# Patient Record
Sex: MALE | Race: WHITE | NOT HISPANIC OR LATINO | Employment: FULL TIME | URBAN - METROPOLITAN AREA
[De-identification: names, ages, dates, MRNs, and addresses within clinical notes are randomized per-mention and may not be internally consistent; named-entity substitution may affect disease eponyms.]

---

## 2019-01-17 ENCOUNTER — HISTORICAL (OUTPATIENT)
Dept: ADMINISTRATIVE | Facility: HOSPITAL | Age: 69
End: 2019-01-17

## 2019-01-17 LAB
ABS NEUT (OLG): 2.96 X10(3)/MCL (ref 2.1–9.2)
ALBUMIN SERPL-MCNC: 3.4 GM/DL (ref 3.4–5)
ALBUMIN/GLOB SERPL: 0.8 {RATIO}
ALP SERPL-CCNC: 96 UNIT/L (ref 50–136)
ALT SERPL-CCNC: 34 UNIT/L (ref 12–78)
APTT PPP: 27 SECOND(S) (ref 24.8–36.9)
AST SERPL-CCNC: 25 UNIT/L (ref 15–37)
BASOPHILS # BLD AUTO: 0 X10(3)/MCL (ref 0–0.2)
BASOPHILS NFR BLD AUTO: 1 %
BILIRUB SERPL-MCNC: 0.4 MG/DL (ref 0.2–1)
BILIRUBIN DIRECT+TOT PNL SERPL-MCNC: 0.1 MG/DL (ref 0–0.2)
BILIRUBIN DIRECT+TOT PNL SERPL-MCNC: 0.3 MG/DL (ref 0–0.8)
BUN SERPL-MCNC: 24 MG/DL (ref 7–18)
CALCIUM SERPL-MCNC: 9.2 MG/DL (ref 8.5–10.1)
CHLORIDE SERPL-SCNC: 108 MMOL/L (ref 98–107)
CO2 SERPL-SCNC: 28 MMOL/L (ref 21–32)
CREAT SERPL-MCNC: 1.32 MG/DL (ref 0.7–1.3)
EOSINOPHIL # BLD AUTO: 0.4 X10(3)/MCL (ref 0–0.9)
EOSINOPHIL NFR BLD AUTO: 5 %
ERYTHROCYTE [DISTWIDTH] IN BLOOD BY AUTOMATED COUNT: 16.1 % (ref 11.5–17)
GLOBULIN SER-MCNC: 4.1 GM/DL (ref 2.4–3.5)
GLUCOSE SERPL-MCNC: 99 MG/DL (ref 74–106)
HCT VFR BLD AUTO: 37.2 % (ref 42–52)
HGB BLD-MCNC: 12.4 GM/DL (ref 14–18)
INR PPP: 1.1 (ref 0–1.3)
LYMPHOCYTES # BLD AUTO: 2.8 X10(3)/MCL (ref 0.6–4.6)
LYMPHOCYTES NFR BLD AUTO: 40 %
MCH RBC QN AUTO: 29.6 PG (ref 27–31)
MCHC RBC AUTO-ENTMCNC: 33.3 GM/DL (ref 33–36)
MCV RBC AUTO: 88.8 FL (ref 80–94)
MONOCYTES # BLD AUTO: 0.8 X10(3)/MCL (ref 0.1–1.3)
MONOCYTES NFR BLD AUTO: 11 %
NEUTROPHILS # BLD AUTO: 2.96 X10(3)/MCL (ref 2.1–9.2)
NEUTROPHILS NFR BLD AUTO: 42 %
PLATELET # BLD AUTO: 179 X10(3)/MCL (ref 130–400)
PMV BLD AUTO: 9.3 FL (ref 9.4–12.4)
POTASSIUM SERPL-SCNC: 3.6 MMOL/L (ref 3.5–5.1)
PROT SERPL-MCNC: 7.5 GM/DL (ref 6.4–8.2)
PROTHROMBIN TIME: 14 SECOND(S) (ref 12.2–14.7)
RBC # BLD AUTO: 4.19 X10(6)/MCL (ref 4.7–6.1)
SODIUM SERPL-SCNC: 144 MMOL/L (ref 136–145)
WBC # SPEC AUTO: 7 X10(3)/MCL (ref 4.5–11.5)

## 2019-01-24 ENCOUNTER — HISTORICAL (OUTPATIENT)
Dept: LAB | Facility: HOSPITAL | Age: 69
End: 2019-01-24

## 2019-01-27 LAB — FINAL CULTURE: NORMAL

## 2020-05-21 ENCOUNTER — HISTORICAL (OUTPATIENT)
Dept: ADMINISTRATIVE | Facility: HOSPITAL | Age: 70
End: 2020-05-21

## 2020-05-24 LAB
FINAL CULTURE: NORMAL
FINAL CULTURE: NORMAL

## 2023-04-19 DIAGNOSIS — D64.9 ANEMIA, UNSPECIFIED: Primary | ICD-10-CM

## 2023-06-19 ENCOUNTER — OFFICE VISIT (OUTPATIENT)
Dept: HEMATOLOGY/ONCOLOGY | Facility: CLINIC | Age: 73
End: 2023-06-19
Payer: MEDICARE

## 2023-06-19 VITALS
TEMPERATURE: 99 F | DIASTOLIC BLOOD PRESSURE: 79 MMHG | RESPIRATION RATE: 18 BRPM | SYSTOLIC BLOOD PRESSURE: 170 MMHG | OXYGEN SATURATION: 96 % | HEART RATE: 62 BPM | HEIGHT: 67 IN | WEIGHT: 223 LBS | BODY MASS INDEX: 35 KG/M2

## 2023-06-19 DIAGNOSIS — D64.9 NORMOCYTIC ANEMIA: ICD-10-CM

## 2023-06-19 DIAGNOSIS — D64.9 ANEMIA, UNSPECIFIED: ICD-10-CM

## 2023-06-19 PROCEDURE — 3078F DIAST BP <80 MM HG: CPT | Mod: CPTII,,, | Performed by: STUDENT IN AN ORGANIZED HEALTH CARE EDUCATION/TRAINING PROGRAM

## 2023-06-19 PROCEDURE — 3077F SYST BP >= 140 MM HG: CPT | Mod: CPTII,,, | Performed by: STUDENT IN AN ORGANIZED HEALTH CARE EDUCATION/TRAINING PROGRAM

## 2023-06-19 PROCEDURE — 1126F PR PAIN SEVERITY QUANTIFIED, NO PAIN PRESENT: ICD-10-PCS | Mod: CPTII,,, | Performed by: STUDENT IN AN ORGANIZED HEALTH CARE EDUCATION/TRAINING PROGRAM

## 2023-06-19 PROCEDURE — 99204 PR OFFICE/OUTPT VISIT, NEW, LEVL IV, 45-59 MIN: ICD-10-PCS | Mod: ,,, | Performed by: STUDENT IN AN ORGANIZED HEALTH CARE EDUCATION/TRAINING PROGRAM

## 2023-06-19 PROCEDURE — 1126F AMNT PAIN NOTED NONE PRSNT: CPT | Mod: CPTII,,, | Performed by: STUDENT IN AN ORGANIZED HEALTH CARE EDUCATION/TRAINING PROGRAM

## 2023-06-19 PROCEDURE — 3008F BODY MASS INDEX DOCD: CPT | Mod: CPTII,,, | Performed by: STUDENT IN AN ORGANIZED HEALTH CARE EDUCATION/TRAINING PROGRAM

## 2023-06-19 PROCEDURE — 3288F PR FALLS RISK ASSESSMENT DOCUMENTED: ICD-10-PCS | Mod: CPTII,,, | Performed by: STUDENT IN AN ORGANIZED HEALTH CARE EDUCATION/TRAINING PROGRAM

## 2023-06-19 PROCEDURE — 1101F PT FALLS ASSESS-DOCD LE1/YR: CPT | Mod: CPTII,,, | Performed by: STUDENT IN AN ORGANIZED HEALTH CARE EDUCATION/TRAINING PROGRAM

## 2023-06-19 PROCEDURE — 1101F PR PT FALLS ASSESS DOC 0-1 FALLS W/OUT INJ PAST YR: ICD-10-PCS | Mod: CPTII,,, | Performed by: STUDENT IN AN ORGANIZED HEALTH CARE EDUCATION/TRAINING PROGRAM

## 2023-06-19 PROCEDURE — 4010F PR ACE/ARB THEARPY RXD/TAKEN: ICD-10-PCS | Mod: CPTII,,, | Performed by: STUDENT IN AN ORGANIZED HEALTH CARE EDUCATION/TRAINING PROGRAM

## 2023-06-19 PROCEDURE — 3077F PR MOST RECENT SYSTOLIC BLOOD PRESSURE >= 140 MM HG: ICD-10-PCS | Mod: CPTII,,, | Performed by: STUDENT IN AN ORGANIZED HEALTH CARE EDUCATION/TRAINING PROGRAM

## 2023-06-19 PROCEDURE — 99204 OFFICE O/P NEW MOD 45 MIN: CPT | Mod: ,,, | Performed by: STUDENT IN AN ORGANIZED HEALTH CARE EDUCATION/TRAINING PROGRAM

## 2023-06-19 PROCEDURE — 1159F PR MEDICATION LIST DOCUMENTED IN MEDICAL RECORD: ICD-10-PCS | Mod: CPTII,,, | Performed by: STUDENT IN AN ORGANIZED HEALTH CARE EDUCATION/TRAINING PROGRAM

## 2023-06-19 PROCEDURE — 3008F PR BODY MASS INDEX (BMI) DOCUMENTED: ICD-10-PCS | Mod: CPTII,,, | Performed by: STUDENT IN AN ORGANIZED HEALTH CARE EDUCATION/TRAINING PROGRAM

## 2023-06-19 PROCEDURE — 1159F MED LIST DOCD IN RCRD: CPT | Mod: CPTII,,, | Performed by: STUDENT IN AN ORGANIZED HEALTH CARE EDUCATION/TRAINING PROGRAM

## 2023-06-19 PROCEDURE — 3078F PR MOST RECENT DIASTOLIC BLOOD PRESSURE < 80 MM HG: ICD-10-PCS | Mod: CPTII,,, | Performed by: STUDENT IN AN ORGANIZED HEALTH CARE EDUCATION/TRAINING PROGRAM

## 2023-06-19 PROCEDURE — 3288F FALL RISK ASSESSMENT DOCD: CPT | Mod: CPTII,,, | Performed by: STUDENT IN AN ORGANIZED HEALTH CARE EDUCATION/TRAINING PROGRAM

## 2023-06-19 PROCEDURE — 4010F ACE/ARB THERAPY RXD/TAKEN: CPT | Mod: CPTII,,, | Performed by: STUDENT IN AN ORGANIZED HEALTH CARE EDUCATION/TRAINING PROGRAM

## 2023-06-19 RX ORDER — LISINOPRIL 10 MG/1
10 TABLET ORAL
COMMUNITY
Start: 2023-04-18

## 2023-06-19 RX ORDER — ROSUVASTATIN CALCIUM 10 MG/1
TABLET, COATED ORAL
COMMUNITY
Start: 2023-02-14

## 2023-06-19 RX ORDER — ALLOPURINOL 100 MG/1
TABLET ORAL
COMMUNITY
Start: 2023-02-14 | End: 2023-11-03

## 2023-06-19 RX ORDER — LORATADINE 10 MG/1
1 TABLET ORAL
COMMUNITY
Start: 2023-01-25

## 2023-06-19 RX ORDER — VIT C/E/ZN/COPPR/LUTEIN/ZEAXAN 250MG-90MG
1 CAPSULE ORAL
COMMUNITY

## 2023-06-19 RX ORDER — CARVEDILOL 25 MG/1
TABLET ORAL
COMMUNITY
Start: 2023-02-14

## 2023-06-19 RX ORDER — VALSARTAN AND HYDROCHLOROTHIAZIDE 320; 25 MG/1; MG/1
TABLET, FILM COATED ORAL
COMMUNITY
Start: 2023-02-14

## 2023-06-19 RX ORDER — OMEPRAZOLE 40 MG/1
40 CAPSULE, DELAYED RELEASE ORAL
COMMUNITY
Start: 2023-04-13

## 2023-06-19 RX ORDER — VALSARTAN AND HYDROCHLOROTHIAZIDE 320; 25 MG/1; MG/1
TABLET, FILM COATED ORAL
COMMUNITY
End: 2023-11-03

## 2023-06-19 RX ORDER — FERROUS GLUCONATE 324(38)MG
TABLET ORAL
COMMUNITY
Start: 2023-04-30 | End: 2023-11-03

## 2023-07-20 NOTE — PROGRESS NOTES
Referring provider: Dr. Barnes    Reason for consultation:  Anemia        HPI:    Patient is a 72-year-old with history of hypertension, GERD, CKD, vitamin-D deficiency who was found to have anemia at his PCP's office and was referred to our clinic for further workup and recommendations.  Patient presented to clinic on 06/19/2023 to establish care.    Patient denies smoking.  Reports daily alcohol use.  Denies any recreational drug use.  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  Denies family history of blood malignancies but does report lung cancer in his mother.  Reports anemia in his daughter.      Interval history:  Today, 07/21/2023, patient denies any acute concerns today.  He denies any fevers, chills, drenching night sweats, decreased appetite, weight loss, easy bruising, bleeding, chest pain, shortness of breath, fatigue.    Laboratory        07/19/2023 WBC count 7.5, hemoglobin 11.3, MCV 88.2, platelet count 165, ANC 4.42    06/19/2023 creatinine 1.19, albumin 3.6, calcium 8.7, LFTs within normal limits, iron 76, TIBC 266, % saturation 29, ferritin 363, folic acid 9.8, vitamin B12 230, WBC count 7.9, hemoglobin 11.5, MCV 89.5, platelet count 193, free kappa light chain 49.9, lambda light chain 18.3, kappa to lambda ratio 2.73, SPEP/Claudia he with an M spike measuring 0.7 grams/deciliter, IgG kappa        04/11/2023:  WBC count 6.7, hemoglobin 10.5, MCV 89, platelet count 190, iron 90,% saturation 32, ferritin 255, TIBC 278.     Past Medical History:   Diagnosis Date    Anemia     Ascending cholangitis     Benign essential hypertension     Disorder of kidney and ureter     GERD (gastroesophageal reflux disease)     Gout, unspecified     History of malignant neoplasm of prostate     Hyperlipemia     Intramural hemorrhage of stomach     Unspecified malignant neoplasm of skin, unspecified     Vitamin D deficiency      Hypertension,  hyperlipidemia,      Past Surgical History:   Procedure Laterality Date    APPENDECTOMY      CHOLECYSTECTOMY      COLONOSCOPY  01/01/2020    CYSTOSCOPY      IMPLANTATION OF PENILE PROSTHESIS  01/01/2014    TRANSURETHRAL CRYOSURGICAL REMOVAL OF PROSTATE       Cholecystectomy    History reviewed. No pertinent family history.    Social History     Socioeconomic History    Marital status:    Tobacco Use    Smoking status: Former     Types: Cigarettes    Smokeless tobacco: Never   Substance and Sexual Activity    Alcohol use: Yes     Comment: social    Drug use: Never       Current Outpatient Medications   Medication Sig Dispense Refill    allopurinoL (ZYLOPRIM) 100 MG tablet allopurinol 100 mg tablet   TAKE ONE TABLET BY MOUTH TWICE A DAY      amLODIPine (NORVASC) 5 MG tablet Take 5 mg by mouth once daily.      aspirin 81 MG Chew Take 1 tablet by mouth once daily.      carvediloL (COREG) 25 MG tablet carvedilol 25 mg tablet   Take 1 tablet twice a day by oral route.      cholecalciferol, vitamin D3, (VITAMIN D3) 25 mcg (1,000 unit) capsule 1 tablet.      ferrous gluconate (FERGON) 324 MG tablet Take by mouth.      lisinopriL 10 MG tablet Take 10 mg by mouth.      loratadine (CLARITIN) 10 mg tablet 1 tablet.      omeprazole (PRILOSEC) 40 MG capsule Take 40 mg by mouth.      rosuvastatin (CRESTOR) 10 MG tablet rosuvastatin 10 mg tablet   Take 1 tablet every day by oral route.      valsartan-hydrochlorothiazide (DIOVAN-HCT) 320-25 mg per tablet valsartan 320 mg-hydrochlorothiazide 25 mg tablet      valsartan-hydrochlorothiazide (DIOVAN-HCT) 320-25 mg per tablet        No current facility-administered medications for this visit.       Review of patient's allergies indicates:   Allergen Reactions    Adhesive tape-silicones     Amlodipine     Labetalol     Neosporin [neomycin-bacitracin-polymyxin]          Review of systems  CONSTITUTIONAL: no fevers, no chills, no weight loss, no fatigue, no weakness  HEMATOLOGIC: no  abnormal bleeding, no abnormal bruising, no drenching night sweats  ONCOLOGIC: no new masses or lumps  HEENT: no vision loss, no tinnitus or hearing loss, no nose bleeding, no dysphagia, no odynophagia  CVS: no chest pain, no palpitations, no dyspnea on exertion  RESP: no shortness of breath, no hemoptysis, no cough  GI: no nausea, no vomiting, no diarrhea, no constipation, no melena, no hematochezia, no hematemesis, no abdominal pain, no increase in abdominal girth  : no dysuria, no hematuria, no hesitancy, no scrotal swelling, no discharge  INTEGUMENT: no rashes, no abnormal bruising, no nail pitting, no hyperpigmentation  NEURO: no falls, no memory loss, no paresthesias or dysesthesias, no urofecal incontinence or retention, no loss of strength on any extremity  MSK: no back pain, no new joint pain, no joint swelling  PSYCH: no suicidal or homicidal ideation, no depression, no insomnia, no anhedonia  ENDOCRINE: no heat or cold intolerance, no polyuria, no polydipsia      Physical Exam:  Vitals:    07/21/23 1132   BP: (!) 170/86   Pulse: 62   Resp: 18   Temp: 98.7 °F (37.1 °C)         ECOG PS 0  GA: AAOx3, NAD  HEENT: NCAT,  moist oral mucous membranes  LYMPH: no cervical, axillary or supraclavicular adenopathy  CVS: s1s2 RRR, no M/R/G  RESP: CTA b/l, no crackles, no wheezes or rhonchi  ABD: soft, NT, ND, BS+, no hepatosplenomegaly  EXT: no deformities, no pedal edema  SKIN: no rashes, warm and dry  NEURO: normal mentation, strength 5/5 on all 4 extremities, no sensory deficits        Assessment    # Normocytic anemia  Noted findings of anemia at his PCP's office, normocytic in nature, with a hemoglobin of 10.5 in February 2023  Discussed with patient the findings of anemia, etiologies including nutritional deficiency, primary bone marrow disorders and anemia of chronic disease.  Noted blood work with low normal vitamin B12 in June 2023      # MGUS, low risk  IgG kappa, M spike measuring 0.7 grams/deciliter and  abnormal serum light chain ratio        Plan   Vitamin B12 1000 mcg p.o.  Plan to follow-up in 3 months with repeat labs including myeloma panel   If patient's hemoglobin level and vitamin B12 level have improved, Will plan to follow-up every 6 months thereafter for diagnosis of low risk MGUS    A total of  30 minutes were spent in review of records, interpretation of test, coordination of care, discussion and counseling with the patient.        Portions of the record may have been created with voice recognition software. Occasional wrong-word or sound-a-like substitutions may have occurred due to the inherent limitations of voice recognition software. Read the chart carefully and recognize, using context, where substitutions have occurred.

## 2023-07-21 ENCOUNTER — OFFICE VISIT (OUTPATIENT)
Dept: HEMATOLOGY/ONCOLOGY | Facility: CLINIC | Age: 73
End: 2023-07-21
Payer: MEDICARE

## 2023-07-21 VITALS
DIASTOLIC BLOOD PRESSURE: 86 MMHG | HEIGHT: 67 IN | WEIGHT: 219.5 LBS | SYSTOLIC BLOOD PRESSURE: 170 MMHG | BODY MASS INDEX: 34.45 KG/M2 | OXYGEN SATURATION: 97 % | HEART RATE: 62 BPM | RESPIRATION RATE: 18 BRPM | TEMPERATURE: 99 F

## 2023-07-21 DIAGNOSIS — D64.9 ANEMIA, UNSPECIFIED TYPE: ICD-10-CM

## 2023-07-21 DIAGNOSIS — E53.8 VITAMIN B12 DEFICIENCY: Primary | ICD-10-CM

## 2023-07-21 DIAGNOSIS — D47.2 MGUS (MONOCLONAL GAMMOPATHY OF UNKNOWN SIGNIFICANCE): ICD-10-CM

## 2023-07-21 PROCEDURE — 3008F BODY MASS INDEX DOCD: CPT | Mod: CPTII,,, | Performed by: STUDENT IN AN ORGANIZED HEALTH CARE EDUCATION/TRAINING PROGRAM

## 2023-07-21 PROCEDURE — 1126F AMNT PAIN NOTED NONE PRSNT: CPT | Mod: CPTII,,, | Performed by: STUDENT IN AN ORGANIZED HEALTH CARE EDUCATION/TRAINING PROGRAM

## 2023-07-21 PROCEDURE — 3079F DIAST BP 80-89 MM HG: CPT | Mod: CPTII,,, | Performed by: STUDENT IN AN ORGANIZED HEALTH CARE EDUCATION/TRAINING PROGRAM

## 2023-07-21 PROCEDURE — 3077F SYST BP >= 140 MM HG: CPT | Mod: CPTII,,, | Performed by: STUDENT IN AN ORGANIZED HEALTH CARE EDUCATION/TRAINING PROGRAM

## 2023-07-21 PROCEDURE — 4010F PR ACE/ARB THEARPY RXD/TAKEN: ICD-10-PCS | Mod: CPTII,,, | Performed by: STUDENT IN AN ORGANIZED HEALTH CARE EDUCATION/TRAINING PROGRAM

## 2023-07-21 PROCEDURE — 3288F FALL RISK ASSESSMENT DOCD: CPT | Mod: CPTII,,, | Performed by: STUDENT IN AN ORGANIZED HEALTH CARE EDUCATION/TRAINING PROGRAM

## 2023-07-21 PROCEDURE — 1101F PT FALLS ASSESS-DOCD LE1/YR: CPT | Mod: CPTII,,, | Performed by: STUDENT IN AN ORGANIZED HEALTH CARE EDUCATION/TRAINING PROGRAM

## 2023-07-21 PROCEDURE — 1159F MED LIST DOCD IN RCRD: CPT | Mod: CPTII,,, | Performed by: STUDENT IN AN ORGANIZED HEALTH CARE EDUCATION/TRAINING PROGRAM

## 2023-07-21 PROCEDURE — 3079F PR MOST RECENT DIASTOLIC BLOOD PRESSURE 80-89 MM HG: ICD-10-PCS | Mod: CPTII,,, | Performed by: STUDENT IN AN ORGANIZED HEALTH CARE EDUCATION/TRAINING PROGRAM

## 2023-07-21 PROCEDURE — 99214 OFFICE O/P EST MOD 30 MIN: CPT | Mod: ,,, | Performed by: STUDENT IN AN ORGANIZED HEALTH CARE EDUCATION/TRAINING PROGRAM

## 2023-07-21 PROCEDURE — 3288F PR FALLS RISK ASSESSMENT DOCUMENTED: ICD-10-PCS | Mod: CPTII,,, | Performed by: STUDENT IN AN ORGANIZED HEALTH CARE EDUCATION/TRAINING PROGRAM

## 2023-07-21 PROCEDURE — 1126F PR PAIN SEVERITY QUANTIFIED, NO PAIN PRESENT: ICD-10-PCS | Mod: CPTII,,, | Performed by: STUDENT IN AN ORGANIZED HEALTH CARE EDUCATION/TRAINING PROGRAM

## 2023-07-21 PROCEDURE — 4010F ACE/ARB THERAPY RXD/TAKEN: CPT | Mod: CPTII,,, | Performed by: STUDENT IN AN ORGANIZED HEALTH CARE EDUCATION/TRAINING PROGRAM

## 2023-07-21 PROCEDURE — 3008F PR BODY MASS INDEX (BMI) DOCUMENTED: ICD-10-PCS | Mod: CPTII,,, | Performed by: STUDENT IN AN ORGANIZED HEALTH CARE EDUCATION/TRAINING PROGRAM

## 2023-07-21 PROCEDURE — 3077F PR MOST RECENT SYSTOLIC BLOOD PRESSURE >= 140 MM HG: ICD-10-PCS | Mod: CPTII,,, | Performed by: STUDENT IN AN ORGANIZED HEALTH CARE EDUCATION/TRAINING PROGRAM

## 2023-07-21 PROCEDURE — 1101F PR PT FALLS ASSESS DOC 0-1 FALLS W/OUT INJ PAST YR: ICD-10-PCS | Mod: CPTII,,, | Performed by: STUDENT IN AN ORGANIZED HEALTH CARE EDUCATION/TRAINING PROGRAM

## 2023-07-21 PROCEDURE — 1159F PR MEDICATION LIST DOCUMENTED IN MEDICAL RECORD: ICD-10-PCS | Mod: CPTII,,, | Performed by: STUDENT IN AN ORGANIZED HEALTH CARE EDUCATION/TRAINING PROGRAM

## 2023-07-21 PROCEDURE — 99214 PR OFFICE/OUTPT VISIT, EST, LEVL IV, 30-39 MIN: ICD-10-PCS | Mod: ,,, | Performed by: STUDENT IN AN ORGANIZED HEALTH CARE EDUCATION/TRAINING PROGRAM

## 2023-07-21 RX ORDER — NAPROXEN SODIUM 220 MG/1
1 TABLET, FILM COATED ORAL DAILY
COMMUNITY

## 2023-07-21 RX ORDER — ACETAMINOPHEN, DEXTROMETHORPHAN HBR, DOXYLAMINE SUCCINATE, PHENYLEPHRINE HCL 650; 20; 12.5; 1 MG/30ML; MG/30ML; MG/30ML; MG/30ML
1000 SOLUTION ORAL DAILY
Qty: 30 EACH | Refills: 3 | Status: SHIPPED | OUTPATIENT
Start: 2023-07-21 | End: 2023-11-14

## 2023-07-21 RX ORDER — AMLODIPINE BESYLATE 5 MG/1
5 TABLET ORAL DAILY
COMMUNITY
Start: 2023-05-15 | End: 2023-11-03

## 2023-11-03 ENCOUNTER — OFFICE VISIT (OUTPATIENT)
Dept: HEMATOLOGY/ONCOLOGY | Facility: CLINIC | Age: 73
End: 2023-11-03
Payer: MEDICARE

## 2023-11-03 VITALS
HEIGHT: 67 IN | RESPIRATION RATE: 18 BRPM | DIASTOLIC BLOOD PRESSURE: 77 MMHG | WEIGHT: 217.88 LBS | OXYGEN SATURATION: 98 % | BODY MASS INDEX: 34.2 KG/M2 | TEMPERATURE: 98 F | HEART RATE: 60 BPM | SYSTOLIC BLOOD PRESSURE: 155 MMHG

## 2023-11-03 DIAGNOSIS — D47.2 MGUS (MONOCLONAL GAMMOPATHY OF UNKNOWN SIGNIFICANCE): Primary | ICD-10-CM

## 2023-11-03 DIAGNOSIS — D64.9 NORMOCYTIC ANEMIA: ICD-10-CM

## 2023-11-03 PROCEDURE — 4010F PR ACE/ARB THEARPY RXD/TAKEN: ICD-10-PCS | Mod: CPTII,,, | Performed by: NURSE PRACTITIONER

## 2023-11-03 PROCEDURE — 1160F PR REVIEW ALL MEDS BY PRESCRIBER/CLIN PHARMACIST DOCUMENTED: ICD-10-PCS | Mod: CPTII,,, | Performed by: NURSE PRACTITIONER

## 2023-11-03 PROCEDURE — 1160F RVW MEDS BY RX/DR IN RCRD: CPT | Mod: CPTII,,, | Performed by: NURSE PRACTITIONER

## 2023-11-03 PROCEDURE — 99213 PR OFFICE/OUTPT VISIT, EST, LEVL III, 20-29 MIN: ICD-10-PCS | Mod: ,,, | Performed by: NURSE PRACTITIONER

## 2023-11-03 PROCEDURE — 1159F MED LIST DOCD IN RCRD: CPT | Mod: CPTII,,, | Performed by: NURSE PRACTITIONER

## 2023-11-03 PROCEDURE — 99213 OFFICE O/P EST LOW 20 MIN: CPT | Mod: ,,, | Performed by: NURSE PRACTITIONER

## 2023-11-03 PROCEDURE — 1101F PR PT FALLS ASSESS DOC 0-1 FALLS W/OUT INJ PAST YR: ICD-10-PCS | Mod: CPTII,,, | Performed by: NURSE PRACTITIONER

## 2023-11-03 PROCEDURE — 3078F PR MOST RECENT DIASTOLIC BLOOD PRESSURE < 80 MM HG: ICD-10-PCS | Mod: CPTII,,, | Performed by: NURSE PRACTITIONER

## 2023-11-03 PROCEDURE — 1126F AMNT PAIN NOTED NONE PRSNT: CPT | Mod: CPTII,,, | Performed by: NURSE PRACTITIONER

## 2023-11-03 PROCEDURE — 3288F PR FALLS RISK ASSESSMENT DOCUMENTED: ICD-10-PCS | Mod: CPTII,,, | Performed by: NURSE PRACTITIONER

## 2023-11-03 PROCEDURE — 3078F DIAST BP <80 MM HG: CPT | Mod: CPTII,,, | Performed by: NURSE PRACTITIONER

## 2023-11-03 PROCEDURE — 1159F PR MEDICATION LIST DOCUMENTED IN MEDICAL RECORD: ICD-10-PCS | Mod: CPTII,,, | Performed by: NURSE PRACTITIONER

## 2023-11-03 PROCEDURE — 3077F SYST BP >= 140 MM HG: CPT | Mod: CPTII,,, | Performed by: NURSE PRACTITIONER

## 2023-11-03 PROCEDURE — 1126F PR PAIN SEVERITY QUANTIFIED, NO PAIN PRESENT: ICD-10-PCS | Mod: CPTII,,, | Performed by: NURSE PRACTITIONER

## 2023-11-03 PROCEDURE — 3077F PR MOST RECENT SYSTOLIC BLOOD PRESSURE >= 140 MM HG: ICD-10-PCS | Mod: CPTII,,, | Performed by: NURSE PRACTITIONER

## 2023-11-03 PROCEDURE — 1101F PT FALLS ASSESS-DOCD LE1/YR: CPT | Mod: CPTII,,, | Performed by: NURSE PRACTITIONER

## 2023-11-03 PROCEDURE — 3288F FALL RISK ASSESSMENT DOCD: CPT | Mod: CPTII,,, | Performed by: NURSE PRACTITIONER

## 2023-11-03 PROCEDURE — 4010F ACE/ARB THERAPY RXD/TAKEN: CPT | Mod: CPTII,,, | Performed by: NURSE PRACTITIONER

## 2023-11-03 RX ORDER — AZELASTINE 1 MG/ML
1 SPRAY, METERED NASAL 2 TIMES DAILY
COMMUNITY

## 2023-11-03 RX ORDER — FLUTICASONE PROPIONATE 50 MCG
1 SPRAY, SUSPENSION (ML) NASAL DAILY
COMMUNITY

## 2023-11-03 RX ORDER — ALLOPURINOL 100 MG/1
100 TABLET ORAL 2 TIMES DAILY
COMMUNITY

## 2023-11-03 NOTE — PROGRESS NOTES
Referring provider: Dr. Barnes    Reason for consultation:  Anemia        HPI:    Patient is a 72-year-old with history of hypertension, GERD, CKD, vitamin-D deficiency who was found to have anemia at his PCP's office and was referred to our clinic for further workup and recommendations.  Patient presented to clinic on 06/19/2023 to establish care.    Patient denies smoking.  Reports daily alcohol use.  Denies any recreational drug use.  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  Denies family history of blood malignancies but does report lung cancer in his mother.  Reports anemia in his daughter.      Interval history:    11/3/2023:  Mr. Kirkland is here today for his follow-up and labs regarding anemia and MGUS. Denies any balance issues, numbness and tingling in the hands and feet, bone pain, rash, increased fatigue, weight loss, fever, infections, night sweats, headaches, swollen lymph nodes, or changes in vision.  Denies any abnormal bleeding or dark tarry stools.  He reports taking iron daily.  Labs reviewed with patient dated 10/31/2023:  Creatinine 1.36, eGFR 51, calcium  8.7, Liver enzymes WNL, Folic acid 7.14, Vitamin B 12 (484), WBC 7.44, Hgb 11.1, HCT 32.2, Plt 172,000,  Kappa/Lambda Ratio 2.86, Free Kappa Light Chain 49.2, Free Lambda Light Chain 17.2, SPEP/IKER is pending.  Weight is stable.  Eating and drinking well.  No recent hospitalizations, infections, or illnesses.    Laboratory    10/31/2023:  Creatinine 1.36, eGFR 51, calcium  8.7, Liver enzymes WNL, Folic acid 7.14, Vitamin B 12 (484), WBC 7.44, Hgb 11.1, HCT 32.2, Plt 172,000,  Kappa/Lambda Ratio 2.86, Free Kappa Light Chain 49.2, Free Lambda Light Chain 17.2, SPEP/IKER is pending.        07/19/2023 WBC count 7.5, hemoglobin 11.3, MCV 88.2, platelet count 165, ANC 4.42    06/19/2023 creatinine 1.19, albumin 3.6, calcium 8.7, LFTs within normal limits, iron 76, TIBC 266, %  saturation 29, ferritin 363, folic acid 9.8, vitamin B12 230, WBC count 7.9, hemoglobin 11.5, MCV 89.5, platelet count 193, free kappa light chain 49.9, lambda light chain 18.3, kappa to lambda ratio 2.73, SPEP/Claudia he with an M spike measuring 0.7 grams/deciliter, IgG kappa        04/11/2023:  WBC count 6.7, hemoglobin 10.5, MCV 89, platelet count 190, iron 90,% saturation 32, ferritin 255, TIBC 278.     Past Medical History:   Diagnosis Date    Anemia     Ascending cholangitis     Benign essential hypertension     Disorder of kidney and ureter     GERD (gastroesophageal reflux disease)     Gout, unspecified     History of malignant neoplasm of prostate     Hyperlipemia     Intramural hemorrhage of stomach     Unspecified malignant neoplasm of skin, unspecified     Vitamin D deficiency      Hypertension, hyperlipidemia,      Past Surgical History:   Procedure Laterality Date    APPENDECTOMY      CHOLECYSTECTOMY      COLONOSCOPY  01/01/2020    CYSTOSCOPY      IMPLANTATION OF PENILE PROSTHESIS  01/01/2014    TRANSURETHRAL CRYOSURGICAL REMOVAL OF PROSTATE       Cholecystectomy    History reviewed. No pertinent family history.    Social History     Socioeconomic History    Marital status:    Tobacco Use    Smoking status: Former     Types: Cigarettes    Smokeless tobacco: Never   Substance and Sexual Activity    Alcohol use: Yes     Comment: social    Drug use: Never       Current Outpatient Medications   Medication Sig Dispense Refill    allopurinoL (ZYLOPRIM) 100 MG tablet Take 100 mg by mouth 2 (two) times daily.      aspirin 81 MG Chew Take 1 tablet by mouth once daily.      azelastine (ASTELIN) 137 mcg (0.1 %) nasal spray 1 spray by Nasal route 2 (two) times daily.      carvediloL (COREG) 25 MG tablet carvedilol 25 mg tablet   Take 1 tablet twice a day by oral route.      cholecalciferol, vitamin D3, (VITAMIN D3) 25 mcg (1,000 unit) capsule 1 tablet.      cyanocobalamin, vitamin B-12, (VITAMIN B-12) 1,000  mcg TbSR Take 1,000 mcg by mouth once daily. 30 each 3    fluticasone propionate (FLONASE ALLERGY RELIEF) 50 mcg/actuation nasal spray 1 spray by Each Nostril route once daily.      lisinopriL 10 MG tablet Take 10 mg by mouth.      loratadine (CLARITIN) 10 mg tablet 1 tablet.      omeprazole (PRILOSEC) 40 MG capsule Take 40 mg by mouth.      rosuvastatin (CRESTOR) 10 MG tablet rosuvastatin 10 mg tablet   Take 1 tablet every day by oral route.      valsartan-hydrochlorothiazide (DIOVAN-HCT) 320-25 mg per tablet valsartan 320 mg-hydrochlorothiazide 25 mg tablet       No current facility-administered medications for this visit.       Review of patient's allergies indicates:   Allergen Reactions    Adhesive tape-silicones     Amlodipine     Labetalol     Neosporin [neomycin-bacitracin-polymyxin]          Review of systems  CONSTITUTIONAL: no fevers, no chills, no weight loss, no fatigue, no weakness  HEMATOLOGIC: no abnormal bleeding, no abnormal bruising, no drenching night sweats  ONCOLOGIC: no new masses or lumps  HEENT: no vision loss, no tinnitus or hearing loss, no nose bleeding, no dysphagia, no odynophagia  CVS: no chest pain, no palpitations, no dyspnea on exertion  RESP: no shortness of breath, no hemoptysis, no cough  GI: no nausea, no vomiting, no diarrhea, no constipation, no melena, no hematochezia, no hematemesis, no abdominal pain, no increase in abdominal girth  : no dysuria, no hematuria, no hesitancy, no scrotal swelling, no discharge  INTEGUMENT: no rashes, no abnormal bruising, no nail pitting, no hyperpigmentation  NEURO: no falls, no memory loss, no paresthesias or dysesthesias, no urofecal incontinence or retention, no loss of strength on any extremity  MSK: no back pain, no new joint pain, no joint swelling  PSYCH: no suicidal or homicidal ideation, no depression, no insomnia, no anhedonia  ENDOCRINE: no heat or cold intolerance, no polyuria, no polydipsia      Physical Exam:  Vitals:     11/03/23 1109   BP: (!) 155/77   Pulse: 60   Resp: 18   Temp: 97.9 °F (36.6 °C)           ECOG PS 0  GA: AAOx3, NAD  HEENT: NCAT,  moist oral mucous membranes  LYMPH: no cervical, axillary or supraclavicular adenopathy  CVS: s1s2 RRR, no M/R/G  RESP: CTA b/l, no crackles, no wheezes or rhonchi  ABD: soft, NT, ND, BS+, no hepatosplenomegaly  EXT: no deformities, no pedal edema  SKIN: no rashes, warm and dry  NEURO: normal mentation, strength 5/5 on all 4 extremities, no sensory deficits        Assessment    # Normocytic anemia  Noted findings of anemia at his PCP's office, normocytic in nature, with a hemoglobin of 10.5 in February 2023  Discussed with patient the findings of anemia, etiologies including nutritional deficiency, primary bone marrow disorders and anemia of chronic disease.  Noted blood work with low normal vitamin B12 in June 2023  # MGUS, low risk  IgG kappa, M spike measuring 0.7 grams/deciliter and abnormal serum light chain ratio    11/3/2023:  Patient feeling well.  Negative subjective and objective exams.  Taking B 12 daily.  Labs are stable from last visit.      Plan   11/3/2023:  Continue Vitamin B12 1000 mcg p.o.  Will plan to follow-up every 6 months for diagnosis of low risk MGUS and anemia.  Continue to follow with PCP for preventative maintenance and vaccines.

## 2023-11-14 DIAGNOSIS — D64.9 ANEMIA, UNSPECIFIED TYPE: ICD-10-CM

## 2023-11-14 DIAGNOSIS — E53.8 VITAMIN B12 DEFICIENCY: ICD-10-CM

## 2023-11-14 RX ORDER — ACETAMINOPHEN, DEXTROMETHORPHAN HBR, DOXYLAMINE SUCCINATE, PHENYLEPHRINE HCL 650; 20; 12.5; 1 MG/30ML; MG/30ML; MG/30ML; MG/30ML
1 SOLUTION ORAL
Qty: 30 EACH | Refills: 3 | Status: SHIPPED | OUTPATIENT
Start: 2023-11-14 | End: 2024-03-19 | Stop reason: SDUPTHER

## 2024-03-19 DIAGNOSIS — E53.8 VITAMIN B12 DEFICIENCY: ICD-10-CM

## 2024-03-19 DIAGNOSIS — D64.9 ANEMIA, UNSPECIFIED TYPE: ICD-10-CM

## 2024-03-19 RX ORDER — ACETAMINOPHEN, DEXTROMETHORPHAN HBR, DOXYLAMINE SUCCINATE, PHENYLEPHRINE HCL 650; 20; 12.5; 1 MG/30ML; MG/30ML; MG/30ML; MG/30ML
1 SOLUTION ORAL DAILY
Qty: 30 EACH | Refills: 3 | Status: SHIPPED | OUTPATIENT
Start: 2024-03-19

## 2024-05-09 NOTE — PROGRESS NOTES
Referring provider: Dr. Barnes    Reason for consultation:  Anemia        HPI:    Patient is a 72-year-old with history of hypertension, GERD, CKD, vitamin-D deficiency who was found to have anemia at his PCP's office and was referred to our clinic for further workup and recommendations.  Patient presented to clinic on 06/19/2023 to establish care.    Patient denies smoking.  Reports daily alcohol use.  Denies any recreational drug use.  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  Denies family history of blood malignancies but does report lung cancer in his mother.  Reports anemia in his daughter.      Interval history:    5/10/24:  Mr. Kirkland is here today for his follow-up and labs regarding anemia and MGUS. Denies any balance issues, numbness and tingling in the hands and feet, bone pain, rash, increased fatigue, weight loss, fever, infections, night sweats, headaches, swollen lymph nodes, or changes in vision.  Denies any abnormal bleeding or dark tarry stools.  He reports taking iron daily.  Weight is stable.  Eating and drinking well.  No recent hospitalizations, infections, or illnesses.    Laboratory  5/10/24:  Creatinine 1.18, eGFR 60, calcium  8.2, Liver enzymes WNL, Folic acid 7.22, Vitamin B 12 879, WBC 9.01, Hgb 11.1, HCT 33.1, Plt 168,000, Beta 2 Microglobulin 2.6,  Kappa/Lambda Ratio 2.17, Free [place] Light Chain 44, Free Lambda Light Chain 20.3, IgG 1209, IgA 98, IgM 60 Mspike 0.5    10/31/2023:  Creatinine 1.36, eGFR 51, calcium  8.7, Liver enzymes WNL, Folic acid 7.14, Vitamin B 12 (484), WBC 7.44, Hgb 11.1, HCT 32.2, Plt 172,000,  Kappa/Lambda Ratio 2.86, Free Kappa Light Chain 49.2, Free Lambda Light Chain 17.2, SPEP/IKER is pending.    07/19/2023 WBC count 7.5, hemoglobin 11.3, MCV 88.2, platelet count 165, ANC 4.42    06/19/2023 creatinine 1.19, albumin 3.6, calcium 8.7, LFTs within normal limits, iron 76, TIBC 266, % saturation  29, ferritin 363, folic acid 9.8, vitamin B12 230, WBC count 7.9, hemoglobin 11.5, MCV 89.5, platelet count 193, free kappa light chain 49.9, lambda light chain 18.3, kappa to lambda ratio 2.73, SPEP/Claudia he with an M spike measuring 0.7 grams/deciliter, IgG kappa    04/11/2023:  WBC count 6.7, hemoglobin 10.5, MCV 89, platelet count 190, iron 90,% saturation 32, ferritin 255, TIBC 278.     Past Medical History:   Diagnosis Date    Anemia     Ascending cholangitis     Benign essential hypertension     Disorder of kidney and ureter     GERD (gastroesophageal reflux disease)     Gout, unspecified     History of malignant neoplasm of prostate     Hyperlipemia     Intramural hemorrhage of stomach     Unspecified malignant neoplasm of skin, unspecified     Vitamin D deficiency      Hypertension, hyperlipidemia,      Past Surgical History:   Procedure Laterality Date    APPENDECTOMY      CHOLECYSTECTOMY      COLONOSCOPY  01/01/2020    CYSTOSCOPY      IMPLANTATION OF PENILE PROSTHESIS  01/01/2014    TRANSURETHRAL CRYOSURGICAL REMOVAL OF PROSTATE       Cholecystectomy    No family history on file.    Social History     Socioeconomic History    Marital status:    Tobacco Use    Smoking status: Former     Types: Cigarettes    Smokeless tobacco: Never   Substance and Sexual Activity    Alcohol use: Yes     Comment: social    Drug use: Never       Current Outpatient Medications   Medication Sig Dispense Refill    allopurinoL (ZYLOPRIM) 100 MG tablet Take 100 mg by mouth 2 (two) times daily.      aspirin 81 MG Chew Take 1 tablet by mouth once daily.      azelastine (ASTELIN) 137 mcg (0.1 %) nasal spray 1 spray by Nasal route 2 (two) times daily.      carvediloL (COREG) 25 MG tablet carvedilol 25 mg tablet   Take 1 tablet twice a day by oral route.      cholecalciferol, vitamin D3, (VITAMIN D3) 25 mcg (1,000 unit) capsule 1 tablet.      cyanocobalamin, vitamin B-12, 1,000 mcg TbSR Take 1 tablet by mouth Daily. 30 each 3     fluticasone propionate (FLONASE ALLERGY RELIEF) 50 mcg/actuation nasal spray 1 spray by Each Nostril route once daily.      lisinopriL 10 MG tablet Take 10 mg by mouth.      loratadine (CLARITIN) 10 mg tablet 1 tablet.      omeprazole (PRILOSEC) 40 MG capsule Take 40 mg by mouth.      rosuvastatin (CRESTOR) 10 MG tablet rosuvastatin 10 mg tablet   Take 1 tablet every day by oral route.      valsartan-hydrochlorothiazide (DIOVAN-HCT) 320-25 mg per tablet valsartan 320 mg-hydrochlorothiazide 25 mg tablet       No current facility-administered medications for this visit.       Review of patient's allergies indicates:   Allergen Reactions    Adhesive tape-silicones     Amlodipine     Labetalol     Neosporin [neomycin-bacitracin-polymyxin]          Review of systems  CONSTITUTIONAL: no fevers, no chills, no weight loss, no fatigue, no weakness  HEMATOLOGIC: no abnormal bleeding, no abnormal bruising, no drenching night sweats  ONCOLOGIC: no new masses or lumps  HEENT: no vision loss, no tinnitus or hearing loss, no nose bleeding, no dysphagia, no odynophagia  CVS: no chest pain, no palpitations, no dyspnea on exertion  RESP: no shortness of breath, no hemoptysis, no cough  GI: no nausea, no vomiting, no diarrhea, no constipation, no melena, no hematochezia, no hematemesis, no abdominal pain, no increase in abdominal girth  : no dysuria, no hematuria, no hesitancy, no scrotal swelling, no discharge  INTEGUMENT: no rashes, no abnormal bruising, no nail pitting, no hyperpigmentation  NEURO: no falls, no memory loss, no paresthesias or dysesthesias, no urofecal incontinence or retention, no loss of strength on any extremity  MSK: no back pain, no new joint pain, no joint swelling  PSYCH: no suicidal or homicidal ideation, no depression, no insomnia, no anhedonia  ENDOCRINE: no heat or cold intolerance, no polyuria, no polydipsia      Physical Exam:  Vitals:    05/10/24 0923   BP: (!) 179/90   Pulse:    Resp:    Temp:               ECOG PS 0  GA: AAOx3, NAD  HEENT: NCAT,  moist oral mucous membranes  LYMPH: no cervical, axillary or supraclavicular adenopathy  CVS: s1s2 RRR, no M/R/G  RESP: CTA b/l, no crackles, no wheezes or rhonchi  ABD: soft, NT, ND, BS+, no hepatosplenomegaly  EXT: no deformities, no pedal edema  SKIN: no rashes, warm and dry  NEURO: normal mentation, strength 5/5 on all 4 extremities, no sensory deficits        Assessment    # Normocytic anemia  Noted findings of anemia at his PCP's office, normocytic in nature, with a hemoglobin of 10.5 in February 2023  Discussed with patient the findings of anemia, etiologies including nutritional deficiency, primary bone marrow disorders and anemia of chronic disease.  Noted blood work with low normal vitamin B12 in June 2023  # MGUS, low risk  IgG kappa, M spike measuring 0.7 grams/deciliter and abnormal serum light chain ratio    11/3/2023:  Patient feeling well.  Negative subjective and objective exams.  Taking B 12 daily.  Labs are stable from last visit.      Plan   5/10/24  discontinue Vitamin B12 1000 mcg p.o.  Start Daily folic acid 1 mg PO  Will plan to follow-up every 6 months for diagnosis of low risk MGUS and anemia.  Continue to follow with PCP for preventative maintenance and vaccines.    Arabella VILCHISP-C

## 2024-05-10 ENCOUNTER — OFFICE VISIT (OUTPATIENT)
Dept: HEMATOLOGY/ONCOLOGY | Facility: CLINIC | Age: 74
End: 2024-05-10
Payer: MEDICARE

## 2024-05-10 VITALS
RESPIRATION RATE: 18 BRPM | BODY MASS INDEX: 34.9 KG/M2 | HEIGHT: 67 IN | DIASTOLIC BLOOD PRESSURE: 90 MMHG | HEART RATE: 62 BPM | SYSTOLIC BLOOD PRESSURE: 179 MMHG | TEMPERATURE: 98 F | OXYGEN SATURATION: 98 % | WEIGHT: 222.38 LBS

## 2024-05-10 DIAGNOSIS — D47.2 MGUS (MONOCLONAL GAMMOPATHY OF UNKNOWN SIGNIFICANCE): Primary | ICD-10-CM

## 2024-05-10 DIAGNOSIS — D64.9 NORMOCYTIC ANEMIA: ICD-10-CM

## 2024-05-10 PROCEDURE — 1126F AMNT PAIN NOTED NONE PRSNT: CPT | Mod: CPTII,,,

## 2024-05-10 PROCEDURE — 3077F SYST BP >= 140 MM HG: CPT | Mod: CPTII,,,

## 2024-05-10 PROCEDURE — 3079F DIAST BP 80-89 MM HG: CPT | Mod: CPTII,,,

## 2024-05-10 PROCEDURE — 3288F FALL RISK ASSESSMENT DOCD: CPT | Mod: CPTII,,,

## 2024-05-10 PROCEDURE — 99214 OFFICE O/P EST MOD 30 MIN: CPT | Mod: ,,,

## 2024-05-10 PROCEDURE — 1101F PT FALLS ASSESS-DOCD LE1/YR: CPT | Mod: CPTII,,,

## 2024-05-10 PROCEDURE — 1159F MED LIST DOCD IN RCRD: CPT | Mod: CPTII,,,

## 2024-05-10 PROCEDURE — 1160F RVW MEDS BY RX/DR IN RCRD: CPT | Mod: CPTII,,,

## 2024-05-10 RX ORDER — FOLIC ACID 1 MG/1
1 TABLET ORAL DAILY
Qty: 30 TABLET | Refills: 6 | Status: SHIPPED | OUTPATIENT
Start: 2024-05-10 | End: 2025-05-10

## 2024-12-12 ENCOUNTER — OFFICE VISIT (OUTPATIENT)
Dept: HEMATOLOGY/ONCOLOGY | Facility: CLINIC | Age: 74
End: 2024-12-12
Payer: MEDICARE

## 2024-12-12 VITALS
DIASTOLIC BLOOD PRESSURE: 80 MMHG | WEIGHT: 217.63 LBS | RESPIRATION RATE: 14 BRPM | TEMPERATURE: 98 F | SYSTOLIC BLOOD PRESSURE: 149 MMHG | OXYGEN SATURATION: 98 % | HEIGHT: 67 IN | HEART RATE: 59 BPM | BODY MASS INDEX: 34.16 KG/M2

## 2024-12-12 DIAGNOSIS — E53.8 VITAMIN B12 DEFICIENCY: ICD-10-CM

## 2024-12-12 DIAGNOSIS — D64.9 NORMOCYTIC ANEMIA: Primary | ICD-10-CM

## 2024-12-12 DIAGNOSIS — D64.9 ANEMIA, UNSPECIFIED TYPE: ICD-10-CM

## 2024-12-12 PROCEDURE — 1101F PT FALLS ASSESS-DOCD LE1/YR: CPT | Mod: CPTII,,, | Performed by: STUDENT IN AN ORGANIZED HEALTH CARE EDUCATION/TRAINING PROGRAM

## 2024-12-12 PROCEDURE — 3288F FALL RISK ASSESSMENT DOCD: CPT | Mod: CPTII,,, | Performed by: STUDENT IN AN ORGANIZED HEALTH CARE EDUCATION/TRAINING PROGRAM

## 2024-12-12 PROCEDURE — 1159F MED LIST DOCD IN RCRD: CPT | Mod: CPTII,,, | Performed by: STUDENT IN AN ORGANIZED HEALTH CARE EDUCATION/TRAINING PROGRAM

## 2024-12-12 PROCEDURE — 99214 OFFICE O/P EST MOD 30 MIN: CPT | Mod: ,,, | Performed by: STUDENT IN AN ORGANIZED HEALTH CARE EDUCATION/TRAINING PROGRAM

## 2024-12-12 PROCEDURE — 1126F AMNT PAIN NOTED NONE PRSNT: CPT | Mod: CPTII,,, | Performed by: STUDENT IN AN ORGANIZED HEALTH CARE EDUCATION/TRAINING PROGRAM

## 2024-12-12 PROCEDURE — 3077F SYST BP >= 140 MM HG: CPT | Mod: CPTII,,, | Performed by: STUDENT IN AN ORGANIZED HEALTH CARE EDUCATION/TRAINING PROGRAM

## 2024-12-12 PROCEDURE — 3079F DIAST BP 80-89 MM HG: CPT | Mod: CPTII,,, | Performed by: STUDENT IN AN ORGANIZED HEALTH CARE EDUCATION/TRAINING PROGRAM

## 2024-12-12 RX ORDER — VIT C/E/ZN/COPPR/LUTEIN/ZEAXAN 250MG-90MG
1 CAPSULE ORAL DAILY
Qty: 30 EACH | Refills: 3 | Status: SHIPPED | OUTPATIENT
Start: 2024-12-12

## 2024-12-12 RX ORDER — SPIRONOLACTONE 25 MG/1
1 TABLET ORAL EVERY MORNING
COMMUNITY

## 2024-12-12 NOTE — PROGRESS NOTES
Referring provider: Dr. Barnes    Reason for consultation:  Anemia        HPI:    Patient is a 72-year-old with history of hypertension, GERD, CKD, vitamin-D deficiency who was found to have anemia at his PCP's office and was referred to our clinic for further workup and recommendations.  Patient presented to clinic on 06/19/2023 to establish care.    Patient denies smoking.  Reports daily alcohol use.  Denies any recreational drug use.  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  Denies family history of blood malignancies but does report lung cancer in his mother.  Reports anemia in his daughter.      Interval history:    Today, 12/12/2024, patient denies any acute concerns today.  He does report new symptoms of nipple pain which started a few months back.  He was seen by his PCP and had an exam done without abnormal masses.  Patient reports being started on spironolactone a few months back around the same time in his symptoms started.  He denies any shortness of breath, chest pain, blood in his stools, dark tarry stools.  He denies any new medications otherwise, ER or hospital visits since his last follow-up with us.    Laboratory      12/02/2024 creatinine 1.76, albumin 3.4, calcium 7.8, LFTs unremarkable, iron 133, TIBC 258,% saturation 52, ferritin 1265, folic acid 17 .1 4, B12 295, WBC count 9.8, hemoglobin 9.2, MCV 90.5, platelet count 155, ANC 4.9, IgG 11 70, IgA 88, IgM 52, M spike 0.6 grams/deciliter, immunofixation shows IgG monoclonal protein with kappa light chain specificity, light chain ratio 2.19 kappa free light chain 48.1, lambda free light chain 22,    5/10/24:  Creatinine 1.18, eGFR 60, calcium  8.2, Liver enzymes WNL, Folic acid 7.22, Vitamin B 12 879, WBC 9.01, Hgb 11.1, HCT 33.1, Plt 168,000, Beta 2 Microglobulin 2.6,  Kappa/Lambda Ratio 2.17, Free [place] Light Chain 44, Free Lambda Light Chain 20.3, IgG 1209, IgA 98, IgM 60  Mspike 0.5    10/31/2023:  Creatinine 1.36, eGFR 51, calcium  8.7, Liver enzymes WNL, Folic acid 7.14, Vitamin B 12 (484), WBC 7.44, Hgb 11.1, HCT 32.2, Plt 172,000,  Kappa/Lambda Ratio 2.86, Free [place] Light Chain 49.2, Free Lambda Light Chain 17.2, SPEP/IKER is pending.    07/19/2023 WBC count 7.5, hemoglobin 11.3, MCV 88.2, platelet count 165, ANC 4.42    06/19/2023 creatinine 1.19, albumin 3.6, calcium 8.7, LFTs within normal limits, iron 76, TIBC 266, % saturation 29, ferritin 363, folic acid 9.8, vitamin B12 230, WBC count 7.9, hemoglobin 11.5, MCV 89.5, platelet count 193, free kappa light chain 49.9, lambda light chain 18.3, kappa to lambda ratio 2.73, SPEP/Iker he with an M spike measuring 0.7 grams/deciliter, IgG kappa    04/11/2023:  WBC count 6.7, hemoglobin 10.5, MCV 89, platelet count 190, iron 90,% saturation 32, ferritin 255, TIBC 278.     Past Medical History:   Diagnosis Date    Anemia     Ascending cholangitis     Benign essential hypertension     Disorder of kidney and ureter     GERD (gastroesophageal reflux disease)     Gout, unspecified     History of malignant neoplasm of prostate     Hyperlipemia     Intramural hemorrhage of stomach     Unspecified malignant neoplasm of skin, unspecified     Vitamin D deficiency      Hypertension, hyperlipidemia,      Past Surgical History:   Procedure Laterality Date    APPENDECTOMY      CHOLECYSTECTOMY      COLONOSCOPY  01/01/2020    CYSTOSCOPY      IMPLANTATION OF PENILE PROSTHESIS  01/01/2014    TRANSURETHRAL CRYOSURGICAL REMOVAL OF PROSTATE       Cholecystectomy    No family history on file.    Social History     Socioeconomic History    Marital status:    Tobacco Use    Smoking status: Former     Types: Cigarettes    Smokeless tobacco: Never   Substance and Sexual Activity    Alcohol use: Yes     Comment: social    Drug use: Never       Current Outpatient Medications   Medication Sig Dispense Refill    allopurinoL (ZYLOPRIM) 100 MG tablet Take 100  mg by mouth 2 (two) times daily.      aspirin 81 MG Chew Take 1 tablet by mouth once daily.      azelastine (ASTELIN) 137 mcg (0.1 %) nasal spray 1 spray by Nasal route 2 (two) times daily.      carvediloL (COREG) 25 MG tablet carvedilol 25 mg tablet   Take 1 tablet twice a day by oral route.      cholecalciferol, vitamin D3, (VITAMIN D3) 25 mcg (1,000 unit) capsule 1 tablet.      fluticasone propionate (FLONASE ALLERGY RELIEF) 50 mcg/actuation nasal spray 1 spray by Each Nostril route once daily.      folic acid (FOLVITE) 1 MG tablet Take 1 tablet (1 mg total) by mouth once daily. 30 tablet 6    omeprazole (PRILOSEC) 40 MG capsule Take 40 mg by mouth.      rosuvastatin (CRESTOR) 10 MG tablet rosuvastatin 10 mg tablet   Take 1 tablet every day by oral route.      spironolactone (ALDACTONE) 25 MG tablet Take 1 tablet by mouth every morning.      valsartan-hydrochlorothiazide (DIOVAN-HCT) 320-25 mg per tablet valsartan 320 mg-hydrochlorothiazide 25 mg tablet      cyanocobalamin, vitamin B-12, 1,000 mcg TbSR Take 1 tablet by mouth Daily. (Patient not taking: Reported on 12/12/2024) 30 each 3    lisinopriL 10 MG tablet Take 10 mg by mouth. (Patient not taking: Reported on 12/12/2024)      loratadine (CLARITIN) 10 mg tablet 1 tablet. (Patient not taking: Reported on 12/12/2024)       No current facility-administered medications for this visit.       Review of patient's allergies indicates:   Allergen Reactions    Adhesive tape-silicones     Amlodipine     Labetalol     Neosporin [neomycin-bacitracin-polymyxin]          Review of systems  CONSTITUTIONAL: no fevers, no chills, no weight loss, no fatigue, no weakness  HEMATOLOGIC: no abnormal bleeding, no abnormal bruising, no drenching night sweats  ONCOLOGIC: no new masses or lumps  HEENT: no vision loss, no tinnitus or hearing loss, no nose bleeding, no dysphagia, no odynophagia  CVS: no chest pain, no palpitations, no dyspnea on exertion  RESP: no shortness of breath,  no hemoptysis, no cough  GI: no nausea, no vomiting, no diarrhea, no constipation, no melena, no hematochezia, no hematemesis, no abdominal pain, no increase in abdominal girth  : no dysuria, no hematuria, no hesitancy, no scrotal swelling, no discharge  INTEGUMENT: no rashes, no abnormal bruising, no nail pitting, no hyperpigmentation  NEURO: no falls, no memory loss, no paresthesias or dysesthesias, no urofecal incontinence or retention, no loss of strength on any extremity  MSK: no back pain, no new joint pain, no joint swelling  PSYCH: no suicidal or homicidal ideation, no depression, no insomnia, no anhedonia  ENDOCRINE: no heat or cold intolerance, no polyuria, no polydipsia      Physical Exam:  Vitals:    12/12/24 1043   BP: (!) 149/80   Pulse: (!) 59   Resp: 14   Temp: 98.2 °F (36.8 °C)       GA: AAOx3, NAD  HEENT:  moist oral mucous membranes  RESP:  Equal chest rise, no accessory muscle use  EXT: no deformities, no pedal edema  SKIN: no rashes, warm and dry  NEURO: normal mentation, no gross neuro deficits            Assessment    # Normocytic anemia likely anemia of chronic disease secondary to CKD  Noted findings of anemia at his PCP's office, normocytic in nature, with a hemoglobin of 10.5 in February 2023  Discussed with patient the findings of anemia, etiologies including nutritional deficiency, primary bone marrow disorders and anemia of chronic disease.  Noted blood work with low normal vitamin B12 in June 2023      # MGUS, low risk  M spike measuring 0.7 grams/deciliter, free light chain ratio at 2.73, normal calcium levels.  Suspect anemia secondary to CKD         Plan   Continue vitamin B12 1000 mcg q.day   Will forward patient's labs from today's visit to his cardiologist.  Patient was started on spironolactone for hypotension recently and is reporting symptoms of gynecomastia as well as has a worsening creatinine level.  Plan to follow-up in 6 months with labs including myeloma panel, vitamin  B12 level to discuss further treatment recommendations       Portions of the record may have been created with voice recognition software. Occasional wrong-word or sound-a-like substitutions may have occurred due to the inherent limitations of voice recognition software.

## 2025-03-20 DIAGNOSIS — D64.9 NORMOCYTIC ANEMIA: ICD-10-CM

## 2025-03-20 RX ORDER — FOLIC ACID 1 MG/1
1000 TABLET ORAL DAILY
Qty: 30 TABLET | Refills: 0 | Status: SHIPPED | OUTPATIENT
Start: 2025-03-20 | End: 2025-04-19

## 2025-04-22 DIAGNOSIS — D64.9 NORMOCYTIC ANEMIA: ICD-10-CM

## 2025-04-23 RX ORDER — FOLIC ACID 1 MG/1
1000 TABLET ORAL DAILY
Qty: 30 TABLET | Refills: 0 | Status: SHIPPED | OUTPATIENT
Start: 2025-04-23 | End: 2025-05-23

## 2025-05-23 DIAGNOSIS — D64.9 NORMOCYTIC ANEMIA: ICD-10-CM

## 2025-05-26 RX ORDER — FOLIC ACID 1 MG/1
1000 TABLET ORAL DAILY
Qty: 30 TABLET | Refills: 0 | Status: SHIPPED | OUTPATIENT
Start: 2025-05-26 | End: 2025-06-25

## 2025-06-21 DIAGNOSIS — D64.9 NORMOCYTIC ANEMIA: ICD-10-CM

## 2025-06-23 RX ORDER — FOLIC ACID 1 MG/1
1000 TABLET ORAL DAILY
Qty: 30 TABLET | Refills: 0 | Status: SHIPPED | OUTPATIENT
Start: 2025-06-23 | End: 2025-07-23

## 2025-06-26 ENCOUNTER — OFFICE VISIT (OUTPATIENT)
Dept: HEMATOLOGY/ONCOLOGY | Facility: CLINIC | Age: 75
End: 2025-06-26
Payer: MEDICARE

## 2025-06-26 VITALS
DIASTOLIC BLOOD PRESSURE: 74 MMHG | TEMPERATURE: 98 F | HEART RATE: 67 BPM | HEIGHT: 67 IN | BODY MASS INDEX: 34.92 KG/M2 | OXYGEN SATURATION: 97 % | RESPIRATION RATE: 14 BRPM | SYSTOLIC BLOOD PRESSURE: 150 MMHG | WEIGHT: 222.5 LBS

## 2025-06-26 DIAGNOSIS — D64.9 NORMOCYTIC ANEMIA: Primary | ICD-10-CM

## 2025-06-26 DIAGNOSIS — E53.8 VITAMIN B12 DEFICIENCY: ICD-10-CM

## 2025-06-26 DIAGNOSIS — D47.2 MGUS (MONOCLONAL GAMMOPATHY OF UNKNOWN SIGNIFICANCE): ICD-10-CM

## 2025-06-26 DIAGNOSIS — D64.9 ANEMIA, UNSPECIFIED TYPE: ICD-10-CM

## 2025-06-26 PROCEDURE — 1159F MED LIST DOCD IN RCRD: CPT | Mod: CPTII,,, | Performed by: STUDENT IN AN ORGANIZED HEALTH CARE EDUCATION/TRAINING PROGRAM

## 2025-06-26 PROCEDURE — 3077F SYST BP >= 140 MM HG: CPT | Mod: CPTII,,, | Performed by: STUDENT IN AN ORGANIZED HEALTH CARE EDUCATION/TRAINING PROGRAM

## 2025-06-26 PROCEDURE — 1126F AMNT PAIN NOTED NONE PRSNT: CPT | Mod: CPTII,,, | Performed by: STUDENT IN AN ORGANIZED HEALTH CARE EDUCATION/TRAINING PROGRAM

## 2025-06-26 PROCEDURE — 99214 OFFICE O/P EST MOD 30 MIN: CPT | Mod: ,,, | Performed by: STUDENT IN AN ORGANIZED HEALTH CARE EDUCATION/TRAINING PROGRAM

## 2025-06-26 PROCEDURE — 1101F PT FALLS ASSESS-DOCD LE1/YR: CPT | Mod: CPTII,,, | Performed by: STUDENT IN AN ORGANIZED HEALTH CARE EDUCATION/TRAINING PROGRAM

## 2025-06-26 PROCEDURE — 3078F DIAST BP <80 MM HG: CPT | Mod: CPTII,,, | Performed by: STUDENT IN AN ORGANIZED HEALTH CARE EDUCATION/TRAINING PROGRAM

## 2025-06-26 PROCEDURE — 3288F FALL RISK ASSESSMENT DOCD: CPT | Mod: CPTII,,, | Performed by: STUDENT IN AN ORGANIZED HEALTH CARE EDUCATION/TRAINING PROGRAM

## 2025-06-26 RX ORDER — AMLODIPINE BESYLATE 5 MG/1
5 TABLET ORAL DAILY
COMMUNITY
Start: 2025-06-02

## 2025-06-26 NOTE — PROGRESS NOTES
Referring provider: Dr. Barnes    Reason for consultation:  Anemia        HPI:    Patient is a 74-year-old with history of hypertension, GERD, CKD, vitamin-D deficiency who was found to have anemia at his PCP's office and was referred to our clinic for further workup and recommendations.  Patient presented to clinic on 06/19/2023 to establish care.    Patient denies smoking.  Reports daily alcohol use.  Denies any recreational drug use.  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  Denies family history of blood malignancies but does report lung cancer in his mother.  Reports anemia in his daughter.      Interval history:    Today, 06/26/2025, patient denies any acute concerns today.  He denies any symptoms of blood in his stools, dark tarry stools, chest pain, palpitations or shortness of breath.  He denies any ER or hospital visits since his last follow-up with us    Laboratory    06/19/2025 creatinine 1.2, albumin 3.6, LFTs unremarkable, ferritin 1584, TIBC 253, iron 110, folic acid 12.04, B12 672, WBC count 7.6, hemoglobin 10.7, MCV 91.7, platelet count 188, ANC 4.03, IgG 1274, IgA 73, IgM 66, M spike 0.6, immunofixation shows IgM monoclonal protein with kappa light chain specificity, kappa light chain 40, lambda light chain 20.5, light chain ratio 1.95      12/02/2024 creatinine 1.76, albumin 3.4, calcium 7.8, LFTs unremarkable, iron 133, TIBC 258,% saturation 52, ferritin 1265, folic acid 17 .1 4, B12 295, WBC count 9.8, hemoglobin 9.2, MCV 90.5, platelet count 155, ANC 4.9, IgG 11 70, IgA 88, IgM 52, M spike 0.6 grams/deciliter, immunofixation shows IgG monoclonal protein with kappa light chain specificity, light chain ratio 2.19 kappa free light chain 48.1, lambda free light chain 22,    5/10/24:  Creatinine 1.18, eGFR 60, calcium  8.2, Liver enzymes WNL, Folic acid 7.22, Vitamin B 12 879, WBC 9.01, Hgb 11.1, HCT 33.1, Plt 168,000, Beta 2  Microglobulin 2.6,  Kappa/Lambda Ratio 2.17, Free [place] Light Chain 44, Free [place] Light Chain 20.3, IgG 1209, IgA 98, IgM 60 Mspike 0.5    10/31/2023:  Creatinine 1.36, eGFR 51, calcium  8.7, Liver enzymes WNL, Folic acid 7.14, Vitamin B 12 (484), WBC 7.44, Hgb 11.1, HCT 32.2, Plt 172,000,  Kappa/Lambda Ratio 2.86, Free [place] Light Chain 49.2, Free Lambda Light Chain 17.2, SPEP/IKER is pending.    07/19/2023 WBC count 7.5, hemoglobin 11.3, MCV 88.2, platelet count 165, ANC 4.42    06/19/2023 creatinine 1.19, albumin 3.6, calcium 8.7, LFTs within normal limits, iron 76, TIBC 266, % saturation 29, ferritin 363, folic acid 9.8, vitamin B12 230, WBC count 7.9, hemoglobin 11.5, MCV 89.5, platelet count 193, free kappa light chain 49.9, lambda light chain 18.3, kappa to lambda ratio 2.73, SPEP/Iker he with an M spike measuring 0.7 grams/deciliter, IgG kappa    04/11/2023:  WBC count 6.7, hemoglobin 10.5, MCV 89, platelet count 190, iron 90,% saturation 32, ferritin 255, TIBC 278.     Past Medical History:   Diagnosis Date    Anemia     Ascending cholangitis     Benign essential hypertension     Disorder of kidney and ureter     GERD (gastroesophageal reflux disease)     Gout, unspecified     History of malignant neoplasm of prostate     Hyperlipemia     Intramural hemorrhage of stomach     Unspecified malignant neoplasm of skin, unspecified     Vitamin D deficiency      Hypertension, hyperlipidemia,      Past Surgical History:   Procedure Laterality Date    APPENDECTOMY      CHOLECYSTECTOMY      COLONOSCOPY  01/01/2020    CYSTOSCOPY      IMPLANTATION OF PENILE PROSTHESIS  01/01/2014    TRANSURETHRAL CRYOSURGICAL REMOVAL OF PROSTATE       Cholecystectomy    No family history on file.    Social History     Socioeconomic History    Marital status:    Tobacco Use    Smoking status: Former     Types: Cigarettes    Smokeless tobacco: Never   Substance and Sexual Activity    Alcohol use: Yes     Comment: social    Drug  use: Never       Current Outpatient Medications   Medication Sig Dispense Refill    allopurinoL (ZYLOPRIM) 100 MG tablet Take 100 mg by mouth 2 (two) times daily.      amLODIPine (NORVASC) 5 MG tablet Take 5 mg by mouth once daily.      aspirin 81 MG Chew Take 1 tablet by mouth once daily.      azelastine (ASTELIN) 137 mcg (0.1 %) nasal spray 1 spray by Nasal route 2 (two) times daily.      carvediloL (COREG) 25 MG tablet carvedilol 25 mg tablet   Take 1 tablet twice a day by oral route.      cholecalciferol, vitamin D3, (VITAMIN D3) 25 mcg (1,000 unit) capsule 1 tablet.      cyanocobalamin, vitamin B-12, 1,000 mcg TbSR Take 1 tablet by mouth Daily. 30 each 3    fluticasone propionate (FLONASE ALLERGY RELIEF) 50 mcg/actuation nasal spray 1 spray by Each Nostril route once daily.      folic acid (FOLVITE) 1 MG tablet Take 1 tablet (1,000 mcg total) by mouth once daily. 30 tablet 0    omeprazole (PRILOSEC) 40 MG capsule Take 40 mg by mouth.      rosuvastatin (CRESTOR) 10 MG tablet rosuvastatin 10 mg tablet   Take 1 tablet every day by oral route.      valsartan-hydrochlorothiazide (DIOVAN-HCT) 320-25 mg per tablet valsartan 320 mg-hydrochlorothiazide 25 mg tablet      lisinopriL 10 MG tablet Take 10 mg by mouth. (Patient not taking: Reported on 6/26/2025)      loratadine (CLARITIN) 10 mg tablet 1 tablet. (Patient not taking: Reported on 6/26/2025)      spironolactone (ALDACTONE) 25 MG tablet Take 1 tablet by mouth every morning. (Patient not taking: Reported on 6/26/2025)       No current facility-administered medications for this visit.       Review of patient's allergies indicates:   Allergen Reactions    Adhesive tape-silicones     Amlodipine     Labetalol     Neosporin [neomycin-bacitracin-polymyxin]          Review of systems  CONSTITUTIONAL: no fevers, no chills, no weight loss, no fatigue, no weakness  HEMATOLOGIC: no abnormal bleeding, no abnormal bruising, no drenching night sweats  ONCOLOGIC: no new masses  or lumps  HEENT: no vision loss, no tinnitus or hearing loss, no nose bleeding, no dysphagia, no odynophagia  CVS: no chest pain, no palpitations, no dyspnea on exertion  RESP: no shortness of breath, no hemoptysis, no cough  GI: no nausea, no vomiting, no diarrhea, no constipation, no melena, no hematochezia, no hematemesis, no abdominal pain, no increase in abdominal girth  : no dysuria, no hematuria, no hesitancy, no scrotal swelling, no discharge  INTEGUMENT: no rashes, no abnormal bruising, no nail pitting, no hyperpigmentation  NEURO: no falls, no memory loss, no paresthesias or dysesthesias, no urofecal incontinence or retention, no loss of strength on any extremity  MSK: no back pain, no new joint pain, no joint swelling  PSYCH: no suicidal or homicidal ideation, no depression, no insomnia, no anhedonia  ENDOCRINE: no heat or cold intolerance, no polyuria, no polydipsia      Physical Exam:  Vitals:    06/26/25 1425   BP: (!) 150/74   Pulse: 67   Resp: 14   Temp: 98 °F (36.7 °C)       GA: AAOx3, NAD  HEENT:  moist oral mucous membranes  RESP:  Equal chest rise, no accessory muscle use  EXT: no deformities, no pedal edema  SKIN: no rashes, warm and dry  NEURO: normal mentation, no gross neuro deficits            Assessment    # Normocytic anemia likely anemia of chronic disease secondary to CKD  Noted findings of anemia at his PCP's office, normocytic in nature, with a hemoglobin of 10.5 in February 2023  Discussed with patient the findings of anemia, etiologies including nutritional deficiency, primary bone marrow disorders and anemia of chronic disease.  Noted blood work with low normal vitamin B12 in June 2023      # MGUS, low risk  M spike measuring 0.7 grams/deciliter, free light chain ratio at 2.73, normal calcium levels in June 2023.  Suspect anemia secondary to CKD         Plan   Reviewed labs, noted largely stable hemoglobin level with mild improvement.  Continue vitamin B12 1000 mcg q.day    Discontinue oral iron  Plan to follow-up in 6 months , labs prior  Will plan to monitor MGUS annually      Portions of the record may have been created with voice recognition software. Occasional wrong-word or sound-a-like substitutions may have occurred due to the inherent limitations of voice recognition software.